# Patient Record
Sex: FEMALE | Race: WHITE
[De-identification: names, ages, dates, MRNs, and addresses within clinical notes are randomized per-mention and may not be internally consistent; named-entity substitution may affect disease eponyms.]

---

## 2020-07-08 ENCOUNTER — HOSPITAL ENCOUNTER (INPATIENT)
Dept: HOSPITAL 62 - ER | Age: 25
LOS: 3 days | Discharge: HOME | End: 2020-07-11
Attending: OBSTETRICS & GYNECOLOGY | Admitting: OBSTETRICS & GYNECOLOGY
Payer: MEDICAID

## 2020-07-08 DIAGNOSIS — Z91.81: ICD-10-CM

## 2020-07-08 DIAGNOSIS — O45.8X3: Primary | ICD-10-CM

## 2020-07-08 DIAGNOSIS — Z3A.33: ICD-10-CM

## 2020-07-08 DIAGNOSIS — O34.593: ICD-10-CM

## 2020-07-08 LAB
ADD MANUAL DIFF: NO
ADD MANUAL DIFF: NO
APPEARANCE UR: CLEAR
APTT BLD: 25.7 SEC (ref 23.5–35.8)
APTT PPP: COLORLESS S
BACTERIA (WET MOUNT): (no result)
BARBITURATES UR QL SCN: NEGATIVE
BASOPHILS # BLD AUTO: 0 10^3/UL (ref 0–0.2)
BASOPHILS # BLD AUTO: 0 10^3/UL (ref 0–0.2)
BASOPHILS NFR BLD AUTO: 0.2 % (ref 0–2)
BASOPHILS NFR BLD AUTO: 0.3 % (ref 0–2)
BILIRUB UR QL STRIP: NEGATIVE
CHLAM PCR: NOT DETECTED
EOSINOPHIL # BLD AUTO: 0.1 10^3/UL (ref 0–0.6)
EOSINOPHIL # BLD AUTO: 0.1 10^3/UL (ref 0–0.6)
EOSINOPHIL NFR BLD AUTO: 1 % (ref 0–6)
EOSINOPHIL NFR BLD AUTO: 1.1 % (ref 0–6)
EPITHELIALS (WET MOUNT): (no result)
ERYTHROCYTE [DISTWIDTH] IN BLOOD BY AUTOMATED COUNT: 13.4 % (ref 11.5–14)
ERYTHROCYTE [DISTWIDTH] IN BLOOD BY AUTOMATED COUNT: 13.7 % (ref 11.5–14)
FIBRINOGEN PPP-MCNC: 405 MG/DL (ref 209–497)
GLUCOSE UR STRIP-MCNC: NEGATIVE MG/DL
HCT VFR BLD CALC: 30.9 % (ref 36–47)
HCT VFR BLD CALC: 34.2 % (ref 36–47)
HGB BLD-MCNC: 10.7 G/DL (ref 12–15.5)
HGB BLD-MCNC: 11.4 G/DL (ref 12–15.5)
INR PPP: 0.89
KETONES UR STRIP-MCNC: NEGATIVE MG/DL
LYMPHOCYTES # BLD AUTO: 2 10^3/UL (ref 0.5–4.7)
LYMPHOCYTES # BLD AUTO: 2.4 10^3/UL (ref 0.5–4.7)
LYMPHOCYTES NFR BLD AUTO: 22.1 % (ref 13–45)
LYMPHOCYTES NFR BLD AUTO: 22.7 % (ref 13–45)
MCH RBC QN AUTO: 29 PG (ref 27–33.4)
MCH RBC QN AUTO: 29.5 PG (ref 27–33.4)
MCHC RBC AUTO-ENTMCNC: 33.4 G/DL (ref 32–36)
MCHC RBC AUTO-ENTMCNC: 34.7 G/DL (ref 32–36)
MCV RBC AUTO: 85 FL (ref 80–97)
MCV RBC AUTO: 87 FL (ref 80–97)
METHADONE UR QL SCN: NEGATIVE
MONOCYTES # BLD AUTO: 1 10^3/UL (ref 0.1–1.4)
MONOCYTES # BLD AUTO: 1.2 10^3/UL (ref 0.1–1.4)
MONOCYTES NFR BLD AUTO: 11.2 % (ref 3–13)
MONOCYTES NFR BLD AUTO: 11.4 % (ref 3–13)
NEUTROPHILS # BLD AUTO: 6.1 10^3/UL (ref 1.7–8.2)
NEUTROPHILS # BLD AUTO: 6.9 10^3/UL (ref 1.7–8.2)
NEUTS SEG NFR BLD AUTO: 64.5 % (ref 42–78)
NEUTS SEG NFR BLD AUTO: 65.5 % (ref 42–78)
NITRITE UR QL STRIP: NEGATIVE
PCP UR QL SCN: NEGATIVE
PH UR STRIP: 7 [PH] (ref 5–9)
PLATELET # BLD: 213 10^3/UL (ref 150–450)
PLATELET # BLD: 216 10^3/UL (ref 150–450)
PROT UR STRIP-MCNC: NEGATIVE MG/DL
PROTHROMBIN TIME: 12 SEC (ref 11.4–15.4)
RBC # BLD AUTO: 3.64 10^6/UL (ref 3.72–5.28)
RBC # BLD AUTO: 3.93 10^6/UL (ref 3.72–5.28)
RBCS (WET MOUNT): (no result)
SP GR UR STRIP: 1
T.VAGINALIS (WET MOUNT): (no result)
TOTAL CELLS COUNTED % (AUTO): 100 %
TOTAL CELLS COUNTED % (AUTO): 100 %
URINE AMPHETAMINES SCREEN: NEGATIVE
URINE BENZODIAZEPINES SCREEN: NEGATIVE
URINE COCAINE SCREEN: NEGATIVE
URINE MARIJUANA (THC) SCREEN: (no result)
UROBILINOGEN UR-MCNC: NEGATIVE MG/DL (ref ?–2)
WBC # BLD AUTO: 10.6 10^3/UL (ref 4–10.5)
WBC # BLD AUTO: 9.3 10^3/UL (ref 4–10.5)
WBCS (WET MOUNT): (no result)
YEAST (WET MOUNT): (no result)

## 2020-07-08 PROCEDURE — 85730 THROMBOPLASTIN TIME PARTIAL: CPT

## 2020-07-08 PROCEDURE — 85384 FIBRINOGEN ACTIVITY: CPT

## 2020-07-08 PROCEDURE — 86900 BLOOD TYPING SEROLOGIC ABO: CPT

## 2020-07-08 PROCEDURE — 86920 COMPATIBILITY TEST SPIN: CPT

## 2020-07-08 PROCEDURE — 86901 BLOOD TYPING SEROLOGIC RH(D): CPT

## 2020-07-08 PROCEDURE — 36415 COLL VENOUS BLD VENIPUNCTURE: CPT

## 2020-07-08 PROCEDURE — 87210 SMEAR WET MOUNT SALINE/INK: CPT

## 2020-07-08 PROCEDURE — 86850 RBC ANTIBODY SCREEN: CPT

## 2020-07-08 PROCEDURE — 94760 N-INVAS EAR/PLS OXIMETRY 1: CPT

## 2020-07-08 PROCEDURE — 99285 EMERGENCY DEPT VISIT HI MDM: CPT

## 2020-07-08 PROCEDURE — 99140 ANES COMP EMERGENCY COND: CPT

## 2020-07-08 PROCEDURE — 84112 EVAL AMNIOTIC FLUID PROTEIN: CPT

## 2020-07-08 PROCEDURE — 85027 COMPLETE CBC AUTOMATED: CPT

## 2020-07-08 PROCEDURE — 94799 UNLISTED PULMONARY SVC/PX: CPT

## 2020-07-08 PROCEDURE — 87491 CHLMYD TRACH DNA AMP PROBE: CPT

## 2020-07-08 PROCEDURE — 88307 TISSUE EXAM BY PATHOLOGIST: CPT

## 2020-07-08 PROCEDURE — 80349 CANNABINOIDS NATURAL: CPT

## 2020-07-08 PROCEDURE — 86592 SYPHILIS TEST NON-TREP QUAL: CPT

## 2020-07-08 PROCEDURE — 85025 COMPLETE CBC W/AUTO DIFF WBC: CPT

## 2020-07-08 PROCEDURE — 81001 URINALYSIS AUTO W/SCOPE: CPT

## 2020-07-08 PROCEDURE — 80307 DRUG TEST PRSMV CHEM ANLYZR: CPT

## 2020-07-08 PROCEDURE — 87591 N.GONORRHOEAE DNA AMP PROB: CPT

## 2020-07-08 PROCEDURE — 87081 CULTURE SCREEN ONLY: CPT

## 2020-07-08 PROCEDURE — 85610 PROTHROMBIN TIME: CPT

## 2020-07-08 PROCEDURE — G0480 DRUG TEST DEF 1-7 CLASSES: HCPCS

## 2020-07-08 NOTE — OPERATIVE REPORT
Operative Report


DATE OF SURGERY: 20


PREOPERATIVE DIAGNOSIS: Intrauterine pregnancy at 33.5 wks EGA.  Placental abru

ption.  PPROM.  Fall on concrete earlier today.  History of uterine abnormality


POSTOPERATIVE DIAGNOSIS: Same as above.  Large clots of dark blood noted in 

lower uterine segment and right side of uterus.  Uterine septum from fundus to 

lower uterine segment


OPERATION: Primary  section


SURGEON: HUSSAIN PELAEZ


ANESTHESIA: Spinal


TISSUE REMOVED OR ALTERED: Placenta, old clots


COMPLICATIONS: 





None


ESTIMATED BLOOD LOSS: 700 cc


INTRAOPERATIVE FINDINGS: Uterus with large thick septum in center extending from

the fundus to the lower uterine segment. Bilateral fallopian tubes and ovaries 

appear normal. Clear amniotic fluid initially and then blood tinged. Large dark 

clots noted-multiple. Viable female infant with nuchal cord x1


PROCEDURE: 





IV fluids: per anesthesia record


 


Urinary output: 800 cc





Position: To recovery room in stable condition





Description of procedure:


The patient was taken to the operating room and spinal anesthesia was 

administered and found to be adequate. She was then placed on the OR table in 

the supine position with a slight leftward tilt. Fetal heart tones assesssed at 

140 bpm. Patient was prepped and draped in usual sterile fashion. Ancef 2 gms 

was given IV prior to the procedure for infection prophylaxis. Timeout was 

taken. A Pfannenstiel skin incision was then made approximately 3 cm above the 

pubic symphysis and carried down to level the rectus fascia. The rectus fascia 

was then nicked in the midline with a scalpel and the fascial incision was 

extended laterally with use of curved Mari scissors. The rectus fascia was then 

grasped with 2 Kocker clamps elevated and the underlying rectus muscle was 

dissected off both bluntly and sharply.   Any bleeding controlled with cautery. 

The rectus muscles were then split in the midline and the peritoneum was 

entered. The peritoneal incision was then extended by manually stretching the 

peritoneum. The bladder blade was positioned.  Bladder flap created and bladder 

blade replaced. The bladder was noted to be out of harm's way. A scalpel was 

then used in the lower uterine for the hysterotomy, slowly until amniotomy was 

obtained a large amount of fluid was noted--clear to blood tinged. The uterine 

incision was then manually stretched. The infant was noted to jolene breech 

postion.   The feet were delivered with minmal difficulty. The legs and body 

were delivered to the level of the shoulders and each arm was reduced and 

delivered. The head was then delivered with minimal difficulty. Nuchal x1 noted 

and reduced right after the head delivered.  Infant breathing and fluid noted 

coming from nose and mouth. Oral and nasal suctioned with bulb syringe.  The 

cord was cut clamped and the infant was handed off to the nurse awaiting. Infant

had tone with arms reaching in the air at hand off. 


The placenta was manually delivered and large dark red clots noted. A thick 

uterine septum noted from fundus to the lower uterine segment.   Using a lap 

gauze the uterus was cleared of all clots and debris. RIng forcep with a 4x4 

gauze used to clear two large clots for the right side of the septum near the 

fundus.  An rey retractor was then placed and the bladder blade placed. The 

uterine incision was then closed with 0 Chromic suture in a running locked 

fashion. A second layer of the same suture was used in a running locked 

imbricated fashion. The uterine incision was inspected and one area was oozing 

in the center of the incision and a box stitch was placed. It was then noted to 

be hemostatic. Retractor was removed. The posterior aspect of the uterus was 

then inspected and anatomy was seen as above.


Warm saline irrigation was used to clear all clots and debris from the abdomen. 

The uterine incision was inspected once more and noted to remain hemostatic. The

bladder blade was removed and the peritoneum was closed with 2-0 chromic in a 

running fashion. The rectus muscles were then reapproximated and the rectus 

fascia was closed with a #1 PDS in a running fashion. The subcutaneous tissue 

was then inspected and any bleeding was controlled with Bovie electrocautery. 

The subcutaneous tissue was then closed with 2-0 Plain Gut suture in a running 

fashion. The skin was then closed with 4-0 Monocryl in a running subcuticular 

fashion. The skin incision was then clean dried and Dermabond was applied over 

the skin incision.


All instrument sponge and needle counts were correct x3 for the procedure the 

patient tolerated the procedure well. She will proceed to recovery room in 

stable condition

## 2020-07-08 NOTE — PDOC H&P
History of Present Illness


Admission Date/PCP: 


  07/08/20 20:59





  





History of Present Illness: 


KIRK FAN is a 24 year old female G1 at 33.5 wks EGA who reports falling 

today and landed on one hip. She felt okay afterwards and felt baby moving but 

then about 1/2 hour ago she had a gush of bloody fluid followed by at least two 

more gushes of blood. SHe came directly to ED and blood was noted on floor as 

well as down patients ankles there.


SHe reports pain in abdomen and she thinks she is feeling the baby still but is 

very scared. 


Denies medical issues, complications in the pregnancy but she was told she has a

possible septum in the uterus and baby is breech. She passed her glucose tests. 

Denies hx of drug/alcohol or tobacco


In conversation about the baby she reports " I thought she might be coming soon 

because I have been checking my cervix and I can get one finger in now"








Social History


Smoking Status: Never Smoker





Family History


Parental Family History Reviewed: Yes


Children Family History Reviewed: Yes


Sibling(s) Family History Reviewed.: Yes





Medication/Allergy


Allergies/Adverse Reactions: 


                                        





No Known Allergies Allergy (Verified 07/08/20 20:28)


   











Review of Systems


Constitutional: ABSENT: chills, fever(s), headache(s), weight gain, weight loss


Cardiovascular: ABSENT: chest pain, dyspnea on exertion, edema, orthropnea, 

palpitations


Respiratory: ABSENT: cough, hemoptysis


Gastrointestinal: ABSENT: abdominal pain, constipation, diarrhea, hematemesis, 

hematochezia, nausea, vomiting


Genitourinary: ABSENT: dysuria, hematuria


Neurological: ABSENT: abnormal gait, abnormal speech, confusion, dizziness, 

focal weakness, syncope


Psychiatric: ABSENT: anxiety, depression, homidical ideation, suicidal ideation


Hematologic/Lymphatic: ABSENT: easy bleeding, easy bruising





Physical Exam





- Physical Exam


Vital Signs: 


                                        











Temp Pulse Resp BP Pulse Ox


 


 99.4 F   108 H  23 H  127/115 H  100 


 


 07/08/20 20:21  07/08/20 20:21  07/08/20 20:21  07/08/20 20:21  07/08/20 20:21








                                 Intake & Output











 07/07/20 07/08/20 07/09/20





 06:59 06:59 06:59


 


Weight   78.018 kg











General appearance: PRESENT: no acute distress, cooperative


Respiratory exam: PRESENT: clear to auscultation aleja


Cardiovascular exam: PRESENT: RRR, +S1, +S2


Neurological exam: PRESENT: alert, awake, oriented to person, oriented to place,

oriented to time


Psychiatric exam: PRESENT: anxious, appropriate affect


Skin exam: PRESENT: dry, intact, warm.  ABSENT: cyanosis, rash





- Gynecological Exam


Labia: normal


Perineum: normal


Vagina: normal


Cervix: other - by sterile speculum exam-cervix is dilated cm or less with blood

coming from os. She has small clots and bright red blood in vault.





- Obstetrical Exam


Tender: Yes





Result


Laboratory Results: 


                                        





                                 07/08/20 20:37 





                                        











  07/08/20 07/08/20





  20:20 20:37


 


WBC  10.6 H  9.3


 


RBC  3.93  3.64 L


 


Hgb  11.4 L  10.7 L


 


Hct  34.2 L  30.9 L


 


MCV  87  85


 


MCH  29.0  29.5


 


MCHC  33.4  34.7


 


RDW  13.7  13.4


 


Plt Count  216  213


 


Seg Neutrophils %  64.5  65.5














Assessment & Plan





- Diagnosis


(1) Placental abruption


Qualifiers: 


   Trimester: third trimester   Qualified Code(s): O45.93 - Premature separation

of placenta, unspecified, third trimester   


Is this a current diagnosis for this admission?: Yes   





(2) Abnormal ultrasound of uterus


Is this a current diagnosis for this admission?: Yes   





(3) Group B streptococcal carriage complicating pregnancy


Is this a current diagnosis for this admission?: Yes   





- Plan Summary


Plan Summary: 





23 yo G1 at approximately 33.5 wks EGA with placental abruption, PPROM, recent 

fall


-Admit to LDR


-NPO, and two large bore IVs. Begin IVFs LR 1 liter now, T&C, cross match for 2 

units on hold


-Ancef 2 gms IV prior to OR


-abdominal prep


-Give one dose BMZ now 


-Plan for PCS

## 2020-07-09 LAB
ERYTHROCYTE [DISTWIDTH] IN BLOOD BY AUTOMATED COUNT: 13.5 % (ref 11.5–14)
HCT VFR BLD CALC: 30.5 % (ref 36–47)
HGB BLD-MCNC: 10.5 G/DL (ref 12–15.5)
MCH RBC QN AUTO: 29 PG (ref 27–33.4)
MCHC RBC AUTO-ENTMCNC: 34.6 G/DL (ref 32–36)
MCV RBC AUTO: 84 FL (ref 80–97)
PLATELET # BLD: 185 10^3/UL (ref 150–450)
RBC # BLD AUTO: 3.64 10^6/UL (ref 3.72–5.28)
WBC # BLD AUTO: 21.4 10^3/UL (ref 4–10.5)

## 2020-07-09 RX ADMIN — Medication SCH CAP: at 10:28

## 2020-07-09 RX ADMIN — DOCUSATE SODIUM SCH MG: 100 CAPSULE, LIQUID FILLED ORAL at 18:21

## 2020-07-09 RX ADMIN — DOCUSATE SODIUM SCH MG: 100 CAPSULE, LIQUID FILLED ORAL at 10:28

## 2020-07-09 RX ADMIN — OXYCODONE AND ACETAMINOPHEN PRN TAB: 5; 325 TABLET ORAL at 10:42

## 2020-07-09 RX ADMIN — OXYCODONE AND ACETAMINOPHEN PRN TAB: 5; 325 TABLET ORAL at 01:53

## 2020-07-09 RX ADMIN — OXYCODONE AND ACETAMINOPHEN PRN TAB: 5; 325 TABLET ORAL at 19:05

## 2020-07-09 RX ADMIN — IBUPROFEN SCH MG: 800 TABLET, FILM COATED ORAL at 13:53

## 2020-07-09 RX ADMIN — IBUPROFEN SCH MG: 800 TABLET, FILM COATED ORAL at 22:52

## 2020-07-09 NOTE — PDOC DELIVERY SUMMARY
Delivery Summary





- Maternal


Hx : I


Hx Para: 0


Intrapartum: Abruption


Ruptured Membranes: SROM


Fluids: Bloody





- Delivery


Presentation: Breech


Fetal Heart Rate Monitoring: Done Pre-Operatively, Externally


Uterine Contraction Monitoring: External


Pattern: Variable Decels


Support Person Present: Yes


Location: OR


: Emergency


Placenta: Abnormal


Placenta Description: Areas of abruption with dark clots noted


Number of Vessels (Cord): 3


Nuchal Cord: Yes - x1, loose 


Estimated Blood Loss: 700cc





- Medications


Type of Anesthesia:: Spinal





- Delivery Personnel


RN: NIGEL CORREA


MD: HUSSAIN PELAEZ

## 2020-07-09 NOTE — ER DOCUMENT REPORT
ED General





- General


Chief Complaint: Vag Bleeding, +preg <12wks


Stated Complaint: FALL/VAGINAL BLEEDING


Mode of Arrival: Ambulatory


Information source: Patient, Relative


Notes: 





Patient is a 24-year-old female presenting to the emergency department by POV 

chief complaint of spontaneous bleeding while pregnant.  Patient states that 

early this morning around 10 AM she had a fall and then this evening about 8 PM 

she started spontaneously bleeding vaginally and was having mild cramping to the

lower abdomen.  Patient states she is approximately 34 weeks pregnant.  Patient 

has no nausea vomiting diarrhea fevers chills prior to this event.


TRAVEL OUTSIDE OF THE [place] IN LAST 30 DAYS: No





- HPI


Onset: Just prior to arrival


Onset/Duration: Sudden


Quality of pain: Throbbing


Severity: Moderate


Pain Level: 3


Associated symptoms: Nausea


Exacerbated by: Standing, Movement, Walking


Relieved by: Denies


Similar symptoms previously: No


Recently seen / treated by doctor: No





- Related Data


Allergies/Adverse Reactions: 


                                        





No Known Allergies Allergy (Verified 07/08/20 20:28)


   











Past Medical History





- General


Information source: Patient





- Social History


Smoking Status: Never Smoker


Chew tobacco use (# tins/day): No


Frequency of alcohol use: None


Drug Abuse: None


Lives with: Spouse/Significant other


Family History: Reviewed & Not Pertinent


Patient has suicidal ideation: No


Patient has homicidal ideation: No





- Medical History


Medical History: Negative


Psychiatric Medical History: 


   Denies: Hx Depression


Surgical Hx: Negative





Review of Systems





- Review of Systems


Constitutional: No symptoms reported


EENT: No symptoms reported


Cardiovascular: No symptoms reported


Respiratory: No symptoms reported


Gastrointestinal: See HPI


Genitourinary: See HPI


Female Genitourinary: See HPI, Pregnant, Vaginal bleeding


Musculoskeletal: No symptoms reported


Skin: No symptoms reported


Hematologic/Lymphatic: No symptoms reported


Neurological/Psychological: No symptoms reported





Physical Exam





- Vital signs


Vitals: 


                                        











Temp Pulse Resp BP Pulse Ox


 


 99.4 F   108 H  23 H  127/115 H  100 


 


 07/08/20 20:21  07/08/20 20:21  07/08/20 20:21  07/08/20 20:21  07/08/20 20:21














- Notes


Notes: 





PHYSICAL EXAMINATION:


 


GENERAL: Well-appearing, well-nourished and in no acute distress.


 


HEAD: Atraumatic, normocephalic.


 


EYES: Pupils equal round and reactive to light, extraocular movements intact, 

sclera anicteric, conjunctiva are normal.


 


ENT: nares patent, oropharynx clear without exudates.  Moist mucous membranes.


 


NECK: Normal range of motion, supple without lymphadenopathy, no appreciable JVD


 


LUNGS: Lungs clear to auscultation bilaterally and equal.  No wheezes rales or 

rhonchi.


 


HEART: Slightly tachycardic rate and rhythm without murmurs


 


ABDOMEN: Obvious gravid uterus bowel sounds are present tender to palpation in 

the suprapubic region, spontaneous vaginal bleeding


 


EXTREMITIES: Active full range of motion, no pitting or edema.  No cyanosis. 2+ 

pulses x4


 


NEUROLOGICAL: No focal neurological deficits. Moves all extremities 

spontaneously and on command.


 


SKIN: Warm, Dry, and intact. Normal turgor, no rashes or lesions noted.





Course





- Re-evaluation


Re-evalutation: 





07/09/20 04:07


Upon presentation to the emergency department patient was immediately brought 

back to room #4 and upon examination patient was bleeding spontaneously from the

vaginal region.  Immediately OB/GYN was called.  While IV access was being 

obtained along with vital signs and basic evaluation patient was able to 

tolerate a transabdominal ultrasound performed by me which demonstrated a single

living intrauterine pregnancy with spontaneous movement, positive cardiac 

movement.


Dr. Carlton OB/Gyn did present to the emergency department and evaluated the 

patient and agreed with admission to OB for further evaluation treatment and 

disposition.  Patient was stable at time of transfer to the OB floor.





- Vital Signs


Vital signs: 


                                        











Temp Pulse Resp BP Pulse Ox


 


 97.8 F   76   16   145/89 H  100 


 


 07/09/20 03:19  07/09/20 03:19  07/09/20 03:19  07/09/20 03:19  07/09/20 03:19














- Laboratory


Result Diagrams: 


                                 07/08/20 20:37





Laboratory results interpreted by me: 


                                        











  07/08/20 07/08/20 07/08/20





  20:20 20:30 20:37


 


WBC  10.6 H  


 


RBC    3.64 L


 


Hgb  11.4 L   10.7 L


 


Hct  34.2 L   30.9 L


 


Fetal Membranes Rupture   POSITIVE H 


 


Crossmatch   














  07/08/20





  20:37


 


WBC 


 


RBC 


 


Hgb 


 


Hct 


 


Fetal Membranes Rupture 


 


Crossmatch  See Detail














Discharge





- Discharge


Clinical Impression: 


 Vaginal bleeding during pregnancy, Accidental fall





Condition: Serious


Disposition: ADMITTED AS INPATIENT


Admitting Provider: Bianka


Unit Admitted: Labor and Delivery

## 2020-07-09 NOTE — DELIVERY SUMMARY
=================================================================

Del Sum A-C

=================================================================

Datetime Report Generated by CPN: 2020 02:21

   

   

=================================================================

DELIVERY PERSONNEL

=================================================================

   

DELIVERY PERSONNEL:  [id number]

Delivery Doctor::  Melony Carlton MD

Anesthesiologist::  Pallister 

CRNA::  Lizandro Brar, CRNA

Circulator::  Davieelodia Regis, RN

Scrub Tech/CNA:  Danica Abraham, ST

Scrub Tech/CNA:  Brandie Feliz, ST

   

=================================================================

MATERNAL INFORMATION

=================================================================

   

Delivery Anesthesia:  Spinal

Medications After Delivery:  Pitocin 30 Units in 500ml NS/D5W; Pitocin

   Drip 20 Units/1000ml NSS

Meds After Delivery Comment:  IV infuisng per order. 

Delivery QBL:  280

Maternal Complications:  Abruptio Placenta; Premature Rupture of

   Membranes

   

=================================================================

LABOR SUMMARY

=================================================================

   

EDC:  2020 00:00

No. Babies in Womb:  1

 Attempted:  No

Labor Anesthesia:  Spinal 

   

=================================================================

LABOR INFORMATION

=================================================================

   

Reason for Induction:  Not Applicable

Onset of Labor:  2020 20:00

Oxytocin:  N/A

Group B Beta Strep:  Unknown

Antibiotics # of Doses:  0

Steroids Given:  Partial Course; < 24 Hours before Delivery

Reason Steroids Not Administered:  Fetal Indication; Imminent Delivery

   

=================================================================

MEMBRANES

=================================================================

   

Membranes Rupture Method:  Spontaneous

Rupture of Membranes:  2020 20:00

Length of Rupture (hr):  2.17

Amniotic Fluid Color:  Bloody

Amniotic Fluid Amount:  Large

Amniotic Fluid Odor:  Normal

   

=================================================================

STAGES OF LABOR

=================================================================

   

Stage 3 hr:  0

Stage 3 min:  1

Total Time in Labor hr:  2

Total Time in Labor min:  11

   

=================================================================

VAGINAL DELIVERY

=================================================================

   

Episiotomy:  None

Laceration #1:  None

Laceration Repair:  Not Applicable

Sponge Count Correct:  N/A

Sharps Count Correct:  N/A

   

=================================================================

CSECTION DELIVERY

=================================================================

   

Primary Indication:  Abruptio Placenta

Secondary Indication:  PPROM 

CSection Urgency:  Emergency

CSection Incidence:  Primary

Labor:  No Labor

Elective:  Nonelective

CSection Incision:  Lower Uterine Transverse

   

=================================================================

BABY A INFORMATION

=================================================================

   

Infant Delivery Date/Time:  2020 22:10

Method of Delivery:  Vaginal

Born in Route :  No

:  N/A

Forceps:  N/A

Vacuum Extraction:  N/A

Shoulder Dystocia :  No

   

=================================================================

PRESENTATION/POSITION BABY A

=================================================================

   

Presentation:  Breech

Breech Presentation:  Complete

   

=================================================================

PLACENTA INFORMATION BABY A

=================================================================

   

Placenta Delivery Time :  2020 22:11

Placenta Method of Delivery:  Manual Removal

Placenta Status:  Delivered

   

=================================================================

APGAR SCORES BABY A

=================================================================

   

Heart Rate 1 min:  Slow, Below 100 bpm

Resp Effort 1 min:  Absent

Reflex Irritability 1 min:  No Response

Muscle Tone 1 min:  Flaccid

Color 1 min:  Blue/Pale

APGAR SCORE 1 MIN:  1

Heart Rate 5 min:  >100 bpm

Resp Effort 5 min:  Good Cry

Reflex Irritability 5 min:  Cough or Sneeze or Pulls Away

Muscle Tone 5 min:  Some Flexion of Extremities

Color 5 min:  Body Pink, Extremities Blue

APGAR SCORE 5 MIN:  8

   

=================================================================

INFANT INFORMATION BABY A

=================================================================

   

Gestational Age at Delivery:  33.5

Gestational Status:  - <34 Weeks

Infant Outcome :  Liveborn

Infant Condition :  Stable

Infant Sex:  Female

   

=================================================================

IDENTIFICATION BABY A

=================================================================

   

Infant Verification Date/Time:  2020 22:18

ID Band Number:  [id number]

Mother's Name Verified:  Yes

Infant Medical Record Number:  [id number]

RN Verifying Infant:  , RN and A.Windsor, RN

   

=================================================================

WEIGHT/LENGTH BABY A

=================================================================

   

Infant Birthweight (gm):  1962

Infant Weight (lb):  4

Infant Weight (oz):  5

Infant Length (in):  17.00

Infant Length (cm):  43.18

   

=================================================================

CORD INFORMATION BABY A

=================================================================

   

No. Cord Vessels:  3

Nuchal Cord :  Around Neck x2, Loose

Cord Blood Taken:  Yes-For Eval (Mom's Blood Type - or O+)

Infant Suction:  Mouth; Nose

   

=================================================================

ASSESSMENT BABY A

=================================================================

   

Skin to Skin:  No

   

=================================================================

BABY B INFORMATION

=================================================================

   

 :  N/A





ON review, I do not agree with Apgar score given. Infant had tone when handed 
off. She was reaching for and grabbed the assistants face mask, was pink and 
cried during delivery of the head as well as while I was suctioning her nose and
mouth. I would assess Apgar at 1 minute to be 6 

MTDD

## 2020-07-09 NOTE — PDOC PROGRESS REPORT
Subjective-OB


Progress Note for:: 20


Subjective: 





Doing well, ready to get catheter out and go to nursery and see baby, pain under

control with meds, eating well, feeling gas, hsb at BS, baby doing ok





Physical Exam (OB)


Vital Signs: 


                                        











Temp Pulse Resp BP Pulse Ox


 


 97.7 F   89   16   136/75 H  98 


 


 20 04:21  20 04:21  20 04:21  20 04:21  20 04:21








                                 Intake & Output











 07/08/20 07/09/20 07/10/20





 06:59 06:59 06:59


 


Output Total  2200 


 


Balance  -2200 


 


Weight  78.018 kg 














- PIH/Pre-Eclampsia


Headache: Absent


Epigastric Pain: No


Visual Changes: No





- 


Dressing Removed: No


Incision: Open


Closure Type: Surgical Glue





- Lochia


Lochia Amount: Small 10-25 ml


Lochia Color: Rubra/Red





- Abdomen


Description: Tender, Soft


Hernia Present: No


Fundal Description: Firm, Midline


Fundal Height: u/u - u/2





Objective-Diagnostic


Laboratory: 


                                        





                                 20 07:06 





                                        











  20





  20:20 20:37 20:37


 


WBC  10.6 H  9.3 


 


RBC  3.93  3.64 L 


 


Hgb  11.4 L  10.7 L 


 


Hct  34.2 L  30.9 L 


 


MCV  87  85 


 


MCH  29.0  29.5 


 


MCHC  33.4  34.7 


 


RDW  13.7  13.4 


 


Plt Count  216  213 


 


Seg Neutrophils %  64.5  65.5 


 


Urine Color   


 


Urine Appearance   


 


Urine pH   


 


Ur Specific Gravity   


 


Urine Protein   


 


Urine Glucose (UA)   


 


Urine Ketones   


 


Urine Blood   


 


Urine Nitrite   


 


Ur Leukocyte Esterase   


 


Urine WBC (Auto)   


 


Urine RBC (Auto)   


 


Blood Type    O POSITIVE


 


Antibody Screen    NEGATIVE














  20





  20:50 07:06


 


WBC   21.4 H D


 


RBC   3.64 L


 


Hgb   10.5 L


 


Hct   30.5 L


 


MCV   84


 


MCH   29.0


 


MCHC   34.6


 


RDW   13.5


 


Plt Count   185


 


Seg Neutrophils %  


 


Urine Color  COLORLESS 


 


Urine Appearance  CLEAR 


 


Urine pH  7.0 


 


Ur Specific Gravity  1.004 


 


Urine Protein  NEGATIVE 


 


Urine Glucose (UA)  NEGATIVE 


 


Urine Ketones  NEGATIVE 


 


Urine Blood  NEGATIVE 


 


Urine Nitrite  NEGATIVE 


 


Ur Leukocyte Esterase  NEGATIVE 


 


Urine WBC (Auto)  1 


 


Urine RBC (Auto)  0 


 


Blood Type  


 


Antibody Screen  














Assessment and Plan(PN)





- Assessment and Plan


(1) Placental abruption


Qualifiers: 


   Trimester: third trimester   Qualified Code(s): O45.93 - Premature separation

of placenta, unspecified, third trimester   


Is this a current diagnosis for this admission?: Yes   





(2) Abnormal ultrasound of uterus


Is this a current diagnosis for this admission?: Yes   





(3) Group B streptococcal carriage complicating pregnancy


Is this a current diagnosis for this admission?: Yes   





(4) Vaginal bleeding during pregnancy


Is this a current diagnosis for this admission?: Yes   





(5) Accidental fall


Qualifiers: 


   Encounter type: initial encounter   Qualified Code(s): W19.XXXA - Unspecified

fall, initial encounter   


Is this a current diagnosis for this admission?: Yes   





(6) Status post emergency  section


Is this a current diagnosis for this admission?: Yes   





- Time Spent with Patient


Time with patient: Less than 15 minutes


Medications reviewed and adjusted accordingly: Yes





- Disposition


Anticipated Discharge: Home


Within: within 24 hours

## 2020-07-10 RX ADMIN — IBUPROFEN SCH MG: 800 TABLET, FILM COATED ORAL at 05:28

## 2020-07-10 RX ADMIN — IBUPROFEN SCH MG: 800 TABLET, FILM COATED ORAL at 14:04

## 2020-07-10 RX ADMIN — OXYCODONE AND ACETAMINOPHEN PRN TAB: 5; 325 TABLET ORAL at 20:14

## 2020-07-10 RX ADMIN — IBUPROFEN SCH: 800 TABLET, FILM COATED ORAL at 22:36

## 2020-07-10 RX ADMIN — DOCUSATE SODIUM SCH MG: 100 CAPSULE, LIQUID FILLED ORAL at 09:22

## 2020-07-10 RX ADMIN — Medication SCH CAP: at 09:22

## 2020-07-10 RX ADMIN — DOCUSATE SODIUM SCH MG: 100 CAPSULE, LIQUID FILLED ORAL at 17:16

## 2020-07-10 NOTE — PDOC PROGRESS REPORT
Subjective-OB


Progress Note for:: 07/10/20


Subjective: 


Pt doing well, no concerns.  Reports light bleeding, reg diet, + flatus and 

voiding without difficulty. Baby in NICU.








Physical Exam (OB)


Vital Signs: 


                                        











Temp Pulse Resp BP Pulse Ox


 


 97.7 F   62   17   112/58 L  100 


 


 07/10/20 08:03  07/10/20 08:03  07/10/20 08:03  07/10/20 08:03  07/10/20 08:03








                                 Intake & Output











 07/09/20 07/10/20 07/11/20





 06:59 06:59 06:59


 


Intake Total  1800 


 


Output Total 2200 1150 


 


Balance -2200 650 


 


Weight 78.018 kg  














- 


Dressing Removed: No


Incision: Well Approximated


Closure Type: Surgical Glue





- Lochia


Lochia Amount: Scant < 10 ml


Lochia Color: Rubra/Red





- Abdomen


Description: Soft


Hernia Present: No


Fundal Description: Firm, Midline


Fundal Height: u/u - u/2





Objective-Diagnostic


Laboratory: 


                                        





                                 20 07:06 











Assessment and Plan(PN)





- Assessment and Plan


(1) Abnormal ultrasound of uterus


Is this a current diagnosis for this admission?: Yes   





(2) Accidental fall


Qualifiers: 


   Encounter type: initial encounter   Qualified Code(s): W19.XXXA - Unspecified

fall, initial encounter   


Is this a current diagnosis for this admission?: Yes   





(3) Group B streptococcal carriage complicating pregnancy


Is this a current diagnosis for this admission?: Yes   





(4) Status post emergency  section


Is this a current diagnosis for this admission?: Yes   





(5) Vaginal bleeding during pregnancy


Is this a current diagnosis for this admission?: Yes   





(6) Placental abruption


Qualifiers: 


   Trimester: third trimester   Qualified Code(s): O45.93 - Premature separation

of placenta, unspecified, third trimester   


Is this a current diagnosis for this admission?: Yes   





- Time Spent with Patient


Time with patient: Less than 15 minutes


Medications reviewed and adjusted accordingly: Yes





- Disposition


Anticipated Discharge: Home


Within: within 24 hours

## 2020-07-11 VITALS — DIASTOLIC BLOOD PRESSURE: 76 MMHG | SYSTOLIC BLOOD PRESSURE: 125 MMHG

## 2020-07-11 RX ADMIN — Medication SCH CAP: at 09:22

## 2020-07-11 RX ADMIN — IBUPROFEN SCH MG: 800 TABLET, FILM COATED ORAL at 09:22

## 2020-07-11 RX ADMIN — DOCUSATE SODIUM SCH MG: 100 CAPSULE, LIQUID FILLED ORAL at 09:22

## 2020-07-11 NOTE — PDOC DISCHARGE SUMMARY
Impression





- Admit/DC Date/PCP


Admission Date/Primary Care Provider: 


  20 20:59





  





Discharge Date: 20





- Discharge Diagnosis


(1) Abnormal ultrasound of uterus


Is this a current diagnosis for this admission?: Yes   





(2) Accidental fall


Is this a current diagnosis for this admission?: Yes   





(3) Group B streptococcal carriage complicating pregnancy


Is this a current diagnosis for this admission?: Yes   





(4) Status post emergency  section


Is this a current diagnosis for this admission?: Yes   





(5) Vaginal bleeding during pregnancy


Is this a current diagnosis for this admission?: Yes   





(6) Placental abruption


Is this a current diagnosis for this admission?: Yes   





- Additional Information


Resuscitation Status: Full Code


Discharge Diet: Regular


Discharge Activity: Balance Activity w/Rest, No Driving, No Lifting Over 10 

Pounds, No Lifting/Push/Pulling, Pelvic Rest, Slowly Increase Activity, No tub 

bath


Prescriptions: 


Oxycodone HCl/Acetaminophen [Percocet 5-325 mg Tablet] 1 tab PO Q4HP PRN #30 

tablet


 PRN Reason: For Pain Scale 3-5


Ibuprofen [Motrin 800 mg Tablet] 800 mg PO Q8HP PRN #60 tablet


 PRN Reason: 


Home Medications: 








Ibuprofen [Motrin 800 mg Tablet] 800 mg PO Q8HP PRN #60 tablet 20 


Oxycodone HCl/Acetaminophen [Percocet 5-325 mg Tablet] 1 tab PO Q4HP PRN #30 

tablet 20 


Prenatal Vit/Dha [Prenatal Multi + Dha Capsule] 1 cap PO DAILY  capsule 20













HPI


Gestational Age: 33.5


Reason(s) for Admission: Ceasarean Section-Primary, Obstetric Complications, 

Other


Prenatal Procedures: NST


Intrapartum Procedure(s): : Low Cervical, Transverse





Results


Laboratory Results: 


                                        











WBC  21.4 10^3/uL (4.0-10.5)  H D 20  07:06    


 


RBC  3.64 10^6/uL (3.72-5.28)  L  20  07:06    


 


Hgb  10.5 g/dL (12.0-15.5)  L  20  07:06    


 


Hct  30.5 % (36.0-47.0)  L  20  07:06    


 


MCV  84 fl (80-97)   20  07:06    


 


MCH  29.0 pg (27.0-33.4)   20  07:06    


 


MCHC  34.6 g/dL (32.0-36.0)   20  07:06    


 


RDW  13.5 % (11.5-14.0)   20  07:06    


 


Plt Count  185 10^3/uL (150-450)   20  07:06    


 


Lymph % (Auto)  22.1 % (13-45)   20  20:37    


 


Mono % (Auto)  11.2 % (3-13)   20  20:37    


 


Eos % (Auto)  1.0 % (0-6)   20  20:37    


 


Baso % (Auto)  0.2 % (0-2)   20  20:37    


 


Absolute Neuts (auto)  6.1 10^3/uL (1.7-8.2)   20  20:37    


 


Absolute Lymphs (auto)  2.0 10^3/uL (0.5-4.7)   20  20:37    


 


Absolute Monos (auto)  1.0 10^3/uL (0.1-1.4)   20  20:37    


 


Absolute Eos (auto)  0.1 10^3/uL (0.0-0.6)   20  20:37    


 


Absolute Basos (auto)  0.0 10^3/uL (0.0-0.2)   20  20:37    


 


Seg Neutrophils %  65.5 % (42-78)   20  20:37    


 


PT  12.0 SEC (11.4-15.4)   20  20:37    


 


INR  0.89   20  20:37    


 


APTT  25.7 SEC (23.5-35.8)   20  20:37    


 


Fibrinogen  405 mg/dL (209-497)   20  20:37    


 


Urine Color  COLORLESS   20  20:50    


 


Urine Appearance  CLEAR   20  20:50    


 


Urine pH  7.0  (5.0-9.0)   20  20:50    


 


Ur Specific Gravity  1.004   20  20:50    


 


Urine Protein  NEGATIVE mg/dL (NEGATIVE)   20  20:50    


 


Urine Glucose (UA)  NEGATIVE mg/dL (NEGATIVE)   20  20:50    


 


Urine Ketones  NEGATIVE mg/dL (NEGATIVE)   20  20:50    


 


Urine Blood  NEGATIVE  (NEGATIVE)   20  20:50    


 


Urine Nitrite  NEGATIVE  (NEGATIVE)   20  20:50    


 


Urine Bilirubin  NEGATIVE  (NEGATIVE)   20  20:50    


 


Urine Urobilinogen  NEGATIVE mg/dL (<2.0)   20  20:50    


 


Ur Leukocyte Esterase  NEGATIVE  (NEGATIVE)   20  20:50    


 


Urine WBC (Auto)  1 /HPF  20  20:50    


 


Urine RBC (Auto)  0 /HPF  20  20:50    


 


Squamous Epi Cells Auto  <1 /HPF  20  20:50    


 


Urine Mucus (Auto)  RARE /LPF  20  20:50    


 


Urine Ascorbic Acid  NEGATIVE  (NEGATIVE)   20  20:50    


 


Fetal Membranes Rupture  POSITIVE  (NEGATIVE)  H  20  20:30    


 


Epi Cells (Wet Prep)  3+ EPITHELIALS SEEN   20  20:30    


 


Bacteria (Wet Prep)  3+ BACTERIA SEEN   20  20:30    


 


Trichomonas (Wet Prep)  NO TRICHOMONAS SEEN   20  20:30    


 


Vaginal WBC  FEW WBCS SEEN   20  20:30    


 


Vaginal RBC  4+ RBCS SEEN   20  20:30    


 


Vaginal Yeast  NO YEAST SEEN   20  20:30    


 


Urine Opiates Screen  NEGATIVE   20  20:50    


 


Urine Methadone Screen  NEGATIVE   20  20:50    


 


Ur Barbiturates Screen  NEGATIVE   20  20:50    


 


Ur Phencyclidine Scrn  NEGATIVE   20  20:50    


 


Ur Amphetamines Screen  NEGATIVE   20  20:50    


 


U Benzodiazepines Scrn  NEGATIVE   20  20:50    


 


Urine Cocaine Screen  NEGATIVE   20  20:50    


 


U Marijuana (THC) Screen  UNCONFIRMED POSITIVE   20  20:50    


 


RPR  NONREACTIVE  (NONREACTIVE)   20  20:37    


 


Chlamydia DNA (PCR)  NOT DETECTED  (NOT DETECT)   20  20:30    


 


N.gonorrhoeae DNA (PCR)  NOT DETECTED  (NOT DETECT)   20  20:30    


 


Blood Type  O POSITIVE   20  20:37    


 


Blood Type Confirm  O POSITIVE   20  21:34    


 


Antibody Screen  NEGATIVE   20  20:37    


 


Crossmatch  See Detail   20  20:37    














Plan


Plan of Treatment: 


f/u at [facility] for incision check


Time Spent: Less than 30 Minutes